# Patient Record
Sex: FEMALE | Race: BLACK OR AFRICAN AMERICAN | NOT HISPANIC OR LATINO | Employment: UNEMPLOYED | URBAN - METROPOLITAN AREA
[De-identification: names, ages, dates, MRNs, and addresses within clinical notes are randomized per-mention and may not be internally consistent; named-entity substitution may affect disease eponyms.]

---

## 2024-10-24 ENCOUNTER — OFFICE VISIT (OUTPATIENT)
Dept: PRIMARY CARE CLINIC | Facility: CLINIC | Age: 39
End: 2024-10-24
Payer: MEDICAID

## 2024-10-24 VITALS
BODY MASS INDEX: 37.01 KG/M2 | HEART RATE: 91 BPM | SYSTOLIC BLOOD PRESSURE: 122 MMHG | OXYGEN SATURATION: 99 % | HEIGHT: 65 IN | DIASTOLIC BLOOD PRESSURE: 81 MMHG | WEIGHT: 222.13 LBS

## 2024-10-24 DIAGNOSIS — L29.9 PRURITUS: ICD-10-CM

## 2024-10-24 DIAGNOSIS — F32.81 PMDD (PREMENSTRUAL DYSPHORIC DISORDER): ICD-10-CM

## 2024-10-24 DIAGNOSIS — Z13.220 SCREENING CHOLESTEROL LEVEL: ICD-10-CM

## 2024-10-24 DIAGNOSIS — R73.03 PREDIABETES: Primary | ICD-10-CM

## 2024-10-24 DIAGNOSIS — N64.89 BREAST ASYMMETRY: ICD-10-CM

## 2024-10-24 DIAGNOSIS — D57.3 SICKLE CELL TRAIT: ICD-10-CM

## 2024-10-24 DIAGNOSIS — G47.00 INSOMNIA, UNSPECIFIED TYPE: ICD-10-CM

## 2024-10-24 DIAGNOSIS — Z11.59 ENCOUNTER FOR HEPATITIS C SCREENING TEST FOR LOW RISK PATIENT: ICD-10-CM

## 2024-10-24 DIAGNOSIS — E55.9 VITAMIN D DEFICIENCY: ICD-10-CM

## 2024-10-24 PROCEDURE — 3008F BODY MASS INDEX DOCD: CPT | Mod: CPTII,,, | Performed by: STUDENT IN AN ORGANIZED HEALTH CARE EDUCATION/TRAINING PROGRAM

## 2024-10-24 PROCEDURE — 3079F DIAST BP 80-89 MM HG: CPT | Mod: CPTII,,, | Performed by: STUDENT IN AN ORGANIZED HEALTH CARE EDUCATION/TRAINING PROGRAM

## 2024-10-24 PROCEDURE — 99203 OFFICE O/P NEW LOW 30 MIN: CPT | Mod: PBBFAC,PN | Performed by: STUDENT IN AN ORGANIZED HEALTH CARE EDUCATION/TRAINING PROGRAM

## 2024-10-24 PROCEDURE — 99999 PR PBB SHADOW E&M-NEW PATIENT-LVL III: CPT | Mod: PBBFAC,,, | Performed by: STUDENT IN AN ORGANIZED HEALTH CARE EDUCATION/TRAINING PROGRAM

## 2024-10-24 PROCEDURE — 1159F MED LIST DOCD IN RCRD: CPT | Mod: CPTII,,, | Performed by: STUDENT IN AN ORGANIZED HEALTH CARE EDUCATION/TRAINING PROGRAM

## 2024-10-24 PROCEDURE — 99204 OFFICE O/P NEW MOD 45 MIN: CPT | Mod: S$PBB,,, | Performed by: STUDENT IN AN ORGANIZED HEALTH CARE EDUCATION/TRAINING PROGRAM

## 2024-10-24 PROCEDURE — 3074F SYST BP LT 130 MM HG: CPT | Mod: CPTII,,, | Performed by: STUDENT IN AN ORGANIZED HEALTH CARE EDUCATION/TRAINING PROGRAM

## 2024-10-24 RX ORDER — FERROUS SULFATE TAB 325 MG (65 MG ELEMENTAL FE) 325 (65 FE) MG
TAB ORAL 2 TIMES DAILY
COMMUNITY
Start: 2024-07-18

## 2024-10-24 RX ORDER — BUPROPION HYDROCHLORIDE 300 MG/1
300 TABLET ORAL DAILY
COMMUNITY
End: 2024-10-24

## 2024-10-24 RX ORDER — HYDROXYZINE HYDROCHLORIDE 10 MG/1
10 TABLET, FILM COATED ORAL 3 TIMES DAILY PRN
Qty: 30 TABLET | Refills: 1 | Status: SHIPPED | OUTPATIENT
Start: 2024-10-24

## 2024-10-24 RX ORDER — ESCITALOPRAM OXALATE 10 MG/1
TABLET ORAL
Qty: 30 TABLET | Refills: 2 | Status: SHIPPED | OUTPATIENT
Start: 2024-10-24

## 2024-10-24 RX ORDER — ASPIRIN 325 MG
50000 TABLET, DELAYED RELEASE (ENTERIC COATED) ORAL
COMMUNITY
Start: 2024-07-20

## 2024-10-24 RX ORDER — HYDROXYZINE HYDROCHLORIDE 10 MG/1
10 TABLET, FILM COATED ORAL 3 TIMES DAILY PRN
Qty: 30 TABLET | Refills: 1 | Status: SHIPPED | OUTPATIENT
Start: 2024-10-24 | End: 2024-10-24

## 2024-10-24 NOTE — PROGRESS NOTES
10/25/2024    Kiffany A Butler  [id number]    Chief Complaint   Patient presents with    [facility]       HPI    History of Present Illness    CHIEF COMPLAINT:  Kiffany presents today for a checkup and to discuss lab results.    BREAST CONCERNS:  She reports her right breast is larger than the left and has grown in size, though she acknowledges overall weight gain. She experiences extreme itchiness in the right breast, particularly at night, which started a few years ago, resolved spontaneously, but has returned and progressively worsened over the past 6 months. She denies any visible rashes, bumps, lumps, or nipple discharge. Aloe vera and cold towel applications provide minimal relief. She reports occasional soreness in both breasts upon waking, which she attributes to her sleeping position.    ANEMIA AND FATIGUE:  She has a family history of sickle cell disease, with her mother having had the condition and she and her children carrying the trait. She has been prescribed high-dose iron supplements by her OB/GYN but admits to inconsistent use due to difficulties obtaining refills. She experiences extreme fatigue, significantly worsening during her menstrual cycle, severely impacting her daily life, work, and household responsibilities. She denies heavy menstrual bleeding but notes that her blood count is typically low. She experiences weakness and difficulty with daily tasks, particularly in the days leading up to and during her menstrual cycle.    LAB RESULTS:  July labs showed abnormal results related to blood oxygen levels and blood sugar. Her hemoglobin A1C indicates pre-diabetes range. She expresses concern about potential further increases since then and acknowledges a possible connection between elevated blood sugar and recent weight gain. Her hemoglobin levels were elevated in July due to intentional iron supplementation for plastic surgery preparation. Without supplementation, her hemoglobin levels are  typically low.    SLEEP ISSUES:  She reports difficulty sleeping for more than 4 hours at a time, a pattern that has persisted for approximately three years. She uses marijuana primarily to aid in falling asleep and denies using any OTC sleep aids or other medications for sleep.    MOOD AND BEHAVIORAL ISSUES:  She experiences extreme anger and rage, particularly severe before her menstrual cycle, significantly impacting her daily life, work performance, and family responsibilities. She reports impulsive behavior affecting both her work and personal life, mentioning difficulties with punctuality and completing tasks. She denies that depression is her primary concern, emphasizing that anger management is her main issue.    MEDICATIONS:  She discontinued Wellbutrin approximately three weeks ago due to perceived lack of efficacy after starting it in mid-June. She was also prescribed Seroquel for sleep but expresses discomfort with its effects, even when taking less than a full dose.    LIFESTYLE FACTORS:  She reports a sedentary lifestyle, working from home and sitting all day. She does not engage in any physical activity. She consumes about four cans of Coca-Cola per day and has re  cently relapsed in smoking, triggered by family health issues. She expresses a desire to quit smoking and mentions previous success in stopping. She denies experiencing typical caffeine effects.    FAMILY HISTORY:  She has a family history of sickle cell disease. Her mother passed away on June 30th and her grandmother on September 30th. As her mother's only child, she had to make many decisions during her mother's illness.    ROS:  General: -fever, -chills, +fatigue, +weight gain, -weight loss  Eyes: -vision changes, -redness, -discharge  ENT: -ear pain, -nasal congestion, -sore throat  Cardiovascular: -chest pain, -palpitations, -lower extremity edema  Respiratory: -cough, -shortness of breath  Gastrointestinal: -abdominal pain, -nausea,  -vomiting, -diarrhea, -constipation, -blood in stool  Genitourinary: -dysuria, -hematuria, -frequency  Musculoskeletal: -joint pain, -muscle pain  Skin: -rash, -lesion  Neurological: -headache, -dizziness, -numbness, -tingling, +weakness  Psychiatric: -anxiety, -depression, +sleep difficulty, +emotional lability  Breasts: +breast pain             Negative 10 point ROS outside of HPI    Social History     Socioeconomic History    Marital status: Single   Tobacco Use    Smoking status: Every Day     Types: Cigarettes   Substance and Sexual Activity    Alcohol use: No    Drug use: Yes     Types: Marijuana    Sexual activity: Yes     Partners: Male     Birth control/protection: None           Current Outpatient Medications:     cholecalciferol, vitamin D3, 1,250 mcg (50,000 unit) capsule, Take 50,000 Units by mouth every 7 days., Disp: , Rfl:     FEROSUL 325 mg (65 mg iron) Tab tablet, Take by mouth 2 (two) times daily., Disp: , Rfl:     ferrous gluconate (FERGON) 324 MG tablet, , Disp: , Rfl:     clobetasol (TEMOVATE) 0.05 % Gel, Apply topically 2 (two) times daily., Disp: 1 each, Rfl: 0    docusate sodium (COLACE) 100 MG capsule, Take 1 capsule (100 mg total) by mouth 2 (two) times daily. (Patient not taking: Reported on 10/24/2024), Disp: 60 capsule, Rfl: 2    EScitalopram oxalate (LEXAPRO) 10 MG tablet, Take 1 tab daily in the 10 days leading to start of menses, Disp: 30 tablet, Rfl: 2    hydrOXYzine HCL (ATARAX) 10 MG Tab, Take 1 tablet (10 mg total) by mouth 3 (three) times daily as needed (itching)., Disp: 30 tablet, Rfl: 1    naproxen (NAPROSYN) 500 MG tablet, Take 1 tablet (500 mg total) by mouth every 8 (eight) hours. (Patient not taking: Reported on 10/24/2024), Disp: 30 tablet, Rfl: 2    oxycodone-acetaminophen (PERCOCET) 5-325 mg per tablet, Take 1 tablet by mouth every 4 (four) hours as needed (pain 1-5/10 on pain scale). (Patient not taking: Reported on 10/24/2024), Disp: 30 tablet, Rfl: 0     27  mg iron- 1 mg Tab, , Disp: , Rfl:       Physical Exam  Vitals:    10/24/24 1109   BP: 122/81   Pulse: 91       Physical Exam      Gen: well appearing, NAD  Breast: right breast larger than left. No lumps bumps or over lying skin changes.  Resp: non labored breathing, no crackles, no wheezes, CTAB  CV: RRR no murmur, gallops, rubs, no LE edema  Abd: soft nontender BS present no organomegaly      Problem List Items Addressed This Visit       Sickle cell trait    Prediabetes - Primary    Pruritus     Other Visit Diagnoses       Vitamin D deficiency        Screening cholesterol level        Encounter for hepatitis C screening test for low risk patient        Breast asymmetry        Insomnia, unspecified type        PMDD (premenstrual dysphoric disorder)                1. Prediabetes  - TSH; Future  - Hemoglobin A1C; Future    2. Sickle cell trait  - Ferritin; Future  - Iron and TIBC; Future  - CBC Auto Differential; Future    3. Vitamin D deficiency  - Vitamin D; Future    4. Pruritus  - Vitamin B12; Future  - Comprehensive Metabolic Panel; Future  - hydrOXYzine HCL (ATARAX) 10 MG Tab; Take 1 tablet (10 mg total) by mouth 3 (three) times daily as needed (itching).  Dispense: 30 tablet; Refill: 1    5. Screening cholesterol level  - Lipid Panel; Future    6. Encounter for hepatitis C screening test for low risk patient  - HEPATITIS C ANTIBODY; Future    7. Breast asymmetry  - US Breast Right Limited; Future    8. Insomnia, unspecified type  - hydrOXYzine HCL (ATARAX) 10 MG Tab; Take 1 tablet (10 mg total) by mouth 3 (three) times daily as needed (itching).  Dispense: 30 tablet; Refill: 1    9. PMDD (premenstrual dysphoric disorder)  - EScitalopram oxalate (LEXAPRO) 10 MG tablet; Take 1 tab daily in the 10 days leading to start of menses  Dispense: 30 tablet; Refill: 2    This note was generated with the assistance of ambient listening technology. Verbal consent was obtained by the patient and accompanying visitor(s) for  the recording of patient appointment to facilitate this note. I attest to having reviewed and edited the generated note for accuracy, though some syntax or spelling errors may persist. Please contact the author of this note for any clarification.        RTC in 8 weeks for medicaiton follow up    Jennifer Drake MD  Family Medicine

## 2024-11-08 ENCOUNTER — HOSPITAL ENCOUNTER (OUTPATIENT)
Dept: RADIOLOGY | Facility: OTHER | Age: 39
Discharge: HOME | End: 2024-11-08
Attending: STUDENT IN AN ORGANIZED HEALTH CARE EDUCATION/TRAINING PROGRAM
Payer: MEDICAID

## 2024-11-08 DIAGNOSIS — N64.89 BREAST ASYMMETRY: ICD-10-CM

## 2024-11-18 ENCOUNTER — TELEPHONE (OUTPATIENT)
Dept: PRIMARY CARE CLINIC | Facility: CLINIC | Age: 39
End: 2024-11-18
Payer: MEDICAID

## 2024-11-18 NOTE — TELEPHONE ENCOUNTER
----- Message from Jaime sent at 11/14/2024 11:23 PM CST -----  Contact: Pt [phone]    ----- Message -----  From: Valentine Ng  Sent: 11/14/2024   3:27 PM CST  To: [facility] Staff    .[email or web address]     Patient is calling for Medical Advice regarding: Pt states she went [facility] to have [facility] done & they told her she couldn't because they don't accept her insurance ( medicaid). Pt wants to know where else can she go.    Patient wants a call back or thru [facility]: call back    Comments:    Please advise patient replies from provider may take up to 48 hours.        Please Call and advise    Thank you    Please do NOT rep[ly to sender as this is from the call center and they answer incoming calls only.

## 2025-01-06 ENCOUNTER — TELEPHONE (OUTPATIENT)
Dept: PRIMARY CARE CLINIC | Facility: CLINIC | Age: 40
End: 2025-01-06
Payer: MEDICAID

## 2025-02-24 ENCOUNTER — TELEPHONE (OUTPATIENT)
Dept: RADIOLOGY | Facility: HOSPITAL | Age: 40
End: 2025-02-24
Payer: MEDICAID